# Patient Record
Sex: FEMALE | Race: WHITE | NOT HISPANIC OR LATINO | URBAN - METROPOLITAN AREA
[De-identification: names, ages, dates, MRNs, and addresses within clinical notes are randomized per-mention and may not be internally consistent; named-entity substitution may affect disease eponyms.]

---

## 2022-09-08 ENCOUNTER — OFFICE VISIT (OUTPATIENT)
Dept: URBAN - METROPOLITAN AREA CLINIC 58 | Facility: CLINIC | Age: 82
End: 2022-09-08
Payer: MEDICARE

## 2022-09-08 DIAGNOSIS — H52.4 PRESBYOPIA: ICD-10-CM

## 2022-09-08 DIAGNOSIS — H35.3232 EXUDATIVE MACULAR DEGENERATION, WITH INACTIVE CHOROIDAL NEOVASCULARIZATION, BILATERAL: ICD-10-CM

## 2022-09-08 DIAGNOSIS — H44.23 DEGENERATIVE MYOPIA, BILATERAL: Primary | ICD-10-CM

## 2022-09-08 DIAGNOSIS — E11.9 TYPE 2 DIABETES MELLITUS W/O COMPLICATION: ICD-10-CM

## 2022-09-08 PROCEDURE — 92004 COMPRE OPH EXAM NEW PT 1/>: CPT | Performed by: STUDENT IN AN ORGANIZED HEALTH CARE EDUCATION/TRAINING PROGRAM

## 2022-09-08 RX ORDER — TORSEMIDE 40 MG/1
40 MG TABLET, FILM COATED ORAL
Qty: 0 | Refills: 0 | Status: ACTIVE
Start: 2022-09-08

## 2022-09-08 RX ORDER — CLONIDINE 0.1 MG/D
PATCH TRANSDERMAL
Qty: 0 | Refills: 0 | Status: ACTIVE
Start: 2022-09-08

## 2022-09-08 RX ORDER — ALLOPURINOL 100 MG/1
100 MG TABLET ORAL
Qty: 0 | Refills: 0 | Status: ACTIVE
Start: 2022-09-08

## 2022-09-08 RX ORDER — POTASSIUM CHLORIDE 1500 MG/1
TABLET, EXTENDED RELEASE ORAL
Qty: 0 | Refills: 0 | Status: ACTIVE
Start: 2022-09-08

## 2022-09-08 RX ORDER — FENOFIBRATE 54 MG/1
54 MG TABLET ORAL
Qty: 0 | Refills: 0 | Status: ACTIVE
Start: 2022-09-08

## 2022-09-08 RX ORDER — ALBUTEROL SULFATE 90 UG/1
INHALANT RESPIRATORY (INHALATION)
Qty: 0 | Refills: 0 | Status: ACTIVE
Start: 2022-09-08

## 2022-09-08 RX ORDER — SIMVASTATIN 20 MG/1
20 MG TABLET, FILM COATED ORAL
Qty: 0 | Refills: 0 | Status: ACTIVE
Start: 2022-09-08

## 2022-09-08 RX ORDER — APIXABAN 2.5 MG/1
2.5 MG TABLET, FILM COATED ORAL
Qty: 0 | Refills: 0 | Status: ACTIVE
Start: 2022-09-08

## 2022-09-08 RX ORDER — PANTOPRAZOLE SODIUM 40 MG/1
40 MG TABLET, DELAYED RELEASE ORAL
Qty: 0 | Refills: 0 | Status: ACTIVE
Start: 2022-09-08

## 2022-09-08 ASSESSMENT — VISUAL ACUITY
OD: CF 1FT
OS: 20/30

## 2022-09-08 ASSESSMENT — KERATOMETRY
OS: 43.75
OD: 42.75

## 2022-09-08 ASSESSMENT — INTRAOCULAR PRESSURE
OS: 21
OD: 20

## 2022-09-08 NOTE — IMPRESSION/PLAN
Impression: Degenerative myopia, bilateral: H44.23. Plan: Discussed diagnosis in detail with patient. Discussed treatment options with patient. Will continue to observe condition and or symptoms. Continue care with [facility] Dr. Hayley Diggs.

## 2022-09-08 NOTE — IMPRESSION/PLAN
Impression: Exudative macular degeneration, with inactive choroidal neovascularization, bilateral: H35.7333.  Plan: see above

## 2022-09-08 NOTE — IMPRESSION/PLAN
Impression: Presbyopia: H52.4. Plan: Patient educated on refractive error. New glasses prescription dispensed to patient, expires 1 year. Adaptation period explained. BCVA reduced. Patient expressed understanding.